# Patient Record
Sex: FEMALE | Race: WHITE | NOT HISPANIC OR LATINO | Employment: FULL TIME | URBAN - NONMETROPOLITAN AREA
[De-identification: names, ages, dates, MRNs, and addresses within clinical notes are randomized per-mention and may not be internally consistent; named-entity substitution may affect disease eponyms.]

---

## 2017-05-22 ENCOUNTER — OFFICE VISIT (OUTPATIENT)
Dept: OBSTETRICS AND GYNECOLOGY | Facility: CLINIC | Age: 42
End: 2017-05-22

## 2017-05-22 VITALS
HEIGHT: 61 IN | BODY MASS INDEX: 36.63 KG/M2 | WEIGHT: 194 LBS | DIASTOLIC BLOOD PRESSURE: 84 MMHG | SYSTOLIC BLOOD PRESSURE: 132 MMHG

## 2017-05-22 DIAGNOSIS — Z30.46 ENCOUNTER FOR NEXPLANON REMOVAL: ICD-10-CM

## 2017-05-22 PROCEDURE — 11982 REMOVE DRUG IMPLANT DEVICE: CPT | Performed by: OBSTETRICS & GYNECOLOGY

## 2021-02-02 ENCOUNTER — OFFICE VISIT (OUTPATIENT)
Dept: OBSTETRICS AND GYNECOLOGY | Facility: CLINIC | Age: 46
End: 2021-02-02

## 2021-02-02 VITALS
BODY MASS INDEX: 37.89 KG/M2 | DIASTOLIC BLOOD PRESSURE: 84 MMHG | HEIGHT: 60 IN | SYSTOLIC BLOOD PRESSURE: 122 MMHG | WEIGHT: 193 LBS

## 2021-02-02 DIAGNOSIS — Z30.017 NEXPLANON INSERTION: ICD-10-CM

## 2021-02-02 DIAGNOSIS — Z01.419 WOMEN'S ANNUAL ROUTINE GYNECOLOGICAL EXAMINATION: Primary | ICD-10-CM

## 2021-02-02 DIAGNOSIS — Z12.31 ENCOUNTER FOR SCREENING MAMMOGRAM FOR MALIGNANT NEOPLASM OF BREAST: ICD-10-CM

## 2021-02-02 PROCEDURE — 11981 INSERTION DRUG DLVR IMPLANT: CPT | Performed by: NURSE PRACTITIONER

## 2021-02-02 PROCEDURE — 99386 PREV VISIT NEW AGE 40-64: CPT | Performed by: NURSE PRACTITIONER

## 2021-02-02 RX ORDER — FAMOTIDINE 10 MG
10 TABLET ORAL NIGHTLY PRN
COMMUNITY

## 2021-02-02 NOTE — PROGRESS NOTES
"Subjective   Elvia Malin is a 45 y.o. pap smear and nexplanon placement     Mammo: WNL per pt 8 years ago  LMP: 01/11/2021  Pap: WNL, 2016  BC: none    Pt presents with desire for pap smear and nexplanon placement.  She reports being diagnosed with \"cervical dysplasia\" when she was 17 years old and treated with cryosurgery.  All pap smears following cervical procedure have been normal.  In the past she was on Nexplanon and tolerated it well.  She has not had intercourse since her last period.    Gynecologic Exam  The patient's pertinent negatives include no genital itching, genital lesions, genital odor, genital rash, missed menses, pelvic pain, vaginal bleeding or vaginal discharge. The patient is experiencing no pain. She is not pregnant. Pertinent negatives include no abdominal pain, chills, constipation, diarrhea, dysuria, fever, flank pain, frequency, headaches, hematuria, rash or urgency. She is sexually active. No, her partner does not have an STD. She uses nothing for contraception. Her menstrual history has been regular.       The following portions of the patient's history were reviewed and updated as appropriate: allergies, current medications, past family history, past medical history, past social history, past surgical history and problem list.    Review of Systems   Constitutional: Negative for chills, diaphoresis, fatigue, fever and unexpected weight change.   Respiratory: Negative for apnea, chest tightness and shortness of breath.    Cardiovascular: Negative for chest pain and palpitations.   Gastrointestinal: Negative for abdominal distention, abdominal pain, constipation and diarrhea.   Genitourinary: Negative for decreased urine volume, difficulty urinating, dyspareunia, dysuria, enuresis, flank pain, frequency, genital sores, hematuria, menstrual problem, missed menses, pelvic pain, urgency, vaginal bleeding, vaginal discharge and vaginal pain.   Skin: Negative for rash.   Neurological: " Negative for headaches.   Psychiatric/Behavioral: Negative for sleep disturbance and suicidal ideas.         Objective   Physical Exam  Vitals signs and nursing note reviewed. Exam conducted with a chaperone present.   Constitutional:       General: She is awake. She is not in acute distress.     Appearance: Normal appearance. She is well-developed and well-groomed. She is not ill-appearing, toxic-appearing or diaphoretic.   Neck:      Thyroid: No thyroid mass, thyromegaly or thyroid tenderness.   Cardiovascular:      Rate and Rhythm: Normal rate and regular rhythm.      Heart sounds: Normal heart sounds.   Pulmonary:      Effort: Pulmonary effort is normal.      Breath sounds: Normal breath sounds.   Chest:      Breasts: Nikhil Score is 5.      Comments: Pt declines   Abdominal:      General: Bowel sounds are normal. There is no distension.      Palpations: Abdomen is soft.      Tenderness: There is no abdominal tenderness.   Genitourinary:     General: Normal vulva.      Exam position: Lithotomy position.      Nikhil stage (genital): 5.      Labia:         Right: No rash, tenderness, lesion or injury.         Left: No rash, tenderness, lesion or injury.       Urethra: No prolapse, urethral pain, urethral swelling or urethral lesion.      Vagina: Normal.      Cervix: Normal.      Uterus: Normal.       Comments: Pap smear obtained, adnexa non-palpable 2/2 body habitus  Lymphadenopathy:      Lower Body: No right inguinal adenopathy. No left inguinal adenopathy.   Skin:     General: Skin is warm and dry.   Neurological:      Mental Status: She is alert and oriented to person, place, and time.      Gait: Gait is intact.   Psychiatric:         Attention and Perception: Attention and perception normal.         Mood and Affect: Mood and affect normal.         Speech: Speech normal.         Behavior: Behavior normal. Behavior is cooperative.       Nexplanon Insertion    Patient's last menstrual period was 01/11/2021 (exact  date).  UPT negative     Date of procedure:  2/2/2021    Risks and benefits discussed? yes  All questions answered? yes  Consents given by the patient  Written consent obtained? yes    Local anesthesia used:  Yes, 3 cc's of lidocaine    Procedure documentation:    The upper left arm (non-dominant) was marked at the intended site of insertion. The skin was cleansed with an antiseptic solution.  Local anesthesia was injected.  The Nexplanon was placed subdermally without difficulty.  The devise was able to be palpated in the arm by both myself and Elvia.  The site was cleansed then a 4x4 clean gauze was place over the site of insertion and wrapped with gauze.     She tolerated the procedure well.  There were no complications.  EBL was minimal.    Nexplanon  [id number]    Post procedure instructions: Remove the wrapping in 24 hours and cover with a  band aid if still open.    Follow up needed: PRN    This note was electronically signed.    SHIVANI Reyes  February 2, 2021      Assessment/Plan   Diagnoses and all orders for this visit:    1. Women's annual routine gynecological examination (Primary)    2. Encounter for screening mammogram for malignant neoplasm of breast  -     Mammo Screening Digital Tomosynthesis Bilateral With CAD; Future    3. Nexplanon insertion      Encouraged self breast awareness. If pap normal, she will receive a letter in 2 weeks, if abnormal we will call her.  RAUL Nexplanon and reviewed potential side effects.  RTC in 1 year for annual gynecological exam or sooner if needed.